# Patient Record
Sex: MALE | Race: WHITE | URBAN - METROPOLITAN AREA
[De-identification: names, ages, dates, MRNs, and addresses within clinical notes are randomized per-mention and may not be internally consistent; named-entity substitution may affect disease eponyms.]

---

## 2018-07-07 ENCOUNTER — OFFICE VISIT (OUTPATIENT)
Dept: URGENT CARE | Facility: URGENT CARE | Age: 18
End: 2018-07-07
Payer: COMMERCIAL

## 2018-07-07 VITALS
HEART RATE: 107 BPM | BODY MASS INDEX: 23.03 KG/M2 | TEMPERATURE: 97.5 F | DIASTOLIC BLOOD PRESSURE: 72 MMHG | WEIGHT: 170 LBS | OXYGEN SATURATION: 99 % | HEIGHT: 72 IN | SYSTOLIC BLOOD PRESSURE: 111 MMHG

## 2018-07-07 DIAGNOSIS — R21 RASH: Primary | ICD-10-CM

## 2018-07-07 PROCEDURE — 99202 OFFICE O/P NEW SF 15 MIN: CPT | Performed by: PHYSICIAN ASSISTANT

## 2018-07-07 RX ORDER — METHYLPREDNISOLONE 4 MG
TABLET, DOSE PACK ORAL
Qty: 21 TABLET | Refills: 0 | Status: SHIPPED | OUTPATIENT
Start: 2018-07-07

## 2018-07-07 NOTE — MR AVS SNAPSHOT
After Visit Summary   7/7/2018    Puma Santiago    MRN: [id number]           Patient Information     Date Of Birth          2000        Visit Information        Provider Department      7/7/2018 7:05 PM Awa Henrdix PA-C [facility]        Today's Diagnoses     Rash    -  1       Follow-ups after your visit        Who to contact     If you have questions or need follow up information about today's clinic visit or your schedule please contact [facility] directly at [phone].  Normal or non-critical lab and imaging results will be communicated to you by MyChart, letter or phone within 4 business days after the clinic has received the results. If you do not hear from us within 7 days, please contact the clinic through MyChart or phone. If you have a critical or abnormal lab result, we will notify you by phone as soon as possible.  Submit refill requests through [email or web address] or call your pharmacy and they will forward the refill request to us. Please allow 3 business days for your refill to be completed.          Additional Information About Your Visit        Care EveryWhere ID     This is your Care EveryWhere ID. This could be used by other organizations to access your [facility] medical records  [id number]        Your Vitals Were     Pulse Temperature Height Pulse Oximetry BMI (Body Mass Index)       107 97.5  F (36.4  C) (Tympanic) 6' (1.829 m) 99% 23.06 kg/m2        Blood Pressure from Last 3 Encounters:   07/07/18 111/72   01/22/12 139/81    Weight from Last 3 Encounters:   07/07/18 170 lb (77.1 kg) (70 %)*   01/22/12 125 lb (56.7 kg) (78 %)*     * Growth percentiles are based on CDC 2-20 Years data.              Today, you had the following     No orders found for display         Today's Medication Changes          These changes are accurate as of 7/7/18  7:44 PM.  If you have any questions, ask your nurse or doctor.               Start  taking these medicines.        Dose/Directions    methylPREDNISolone 4 MG tablet   Commonly known as:  MEDROL DOSEPAK   Used for:  Rash   Started by:  Awa Hendrix PA-C        Follow package instructions   Quantity:  21 tablet   Refills:  0            Where to get your medicines      These medications were sent to [id number] - [place], [place] - [address] AT [facility] [address]  [address], [place] [address]    Hours:  24-hours Phone:  [phone]     methylPREDNISolone 4 MG tablet                Primary Care Provider Office Phone # Fax #    Berlin Goldstein [phone]       [facility] [address] [address] [address]        Equal Access to Services     BRIE GALLO : Hadii martín chamorro hadasho Soomaali, waaxda luqadaha, qaybta kaalmada adeegyada, waxay scottin haykelsey pierre . So [facility] [phone].    ATENCIÓN: Si habla español, tiene a greenwood disposición servicios gratuitos de asistencia lingüística. [facility] [phone].    We comply with applicable federal civil rights laws and [place] laws. We do not discriminate on the basis of race, color, national origin, age, disability, sex, sexual orientation, or gender identity.            Thank you!     Thank you for choosing [facility]  for your care. Our goal is always to provide you with excellent care. Hearing back from our patients is one way we can continue to improve our services. Please take a few minutes to complete the written survey that you may receive in the mail after your visit with us. Thank you!             Your Updated Medication List - Protect others around you: Learn how to safely use, store and throw away your medicines at www.disposemymeds.org.          This list is accurate as of 7/7/18  7:44 PM.  Always use your most recent med list.                   Brand Name Dispense Instructions for use Diagnosis    acetaminophen-codeine  300-30 MG per tablet    TYLENOL WITH CODEINE #3    20 tablet    Take 1-2 tablets by mouth every 6 hours as needed for pain.        CELEXA PO           methylPREDNISolone 4 MG tablet    MEDROL DOSEPAK    21 tablet    Follow package instructions    Rash

## 2018-07-08 NOTE — PROGRESS NOTES
HPI:  Puma is a 16 yo male who presents for red rash on scalp after applying hair dye 2 days ago.    Reports he has dyed his hair before but this time he used an excessive amount of dye.  He left it on for 30 minutes.  Reports very itchy.  Reports some swelling in his face as well.   He has put essential oils and aloe on rash but it has not improved it.  Feels maybe rash has spread a bit to top of his shoulders.    No SOB or difficulty breathing.    ROS:  See HPI      PE:  Vitals & nursing notes reviewed. B/P: 111/72, T: 97.5, P: 107, R: Data Unavailable  Constitutional:  Alert, well nourished, well-developed, NAD  Head/scalp:  Confluent erythematous maculopapular rash on scalp, forehead, ears, and extending onto neck and shoulders.  No d/c.   Edema appreciated to right portion of scalp/forehead and ear pinnas.        ASSESSMENT:  (R21) Rash     Comment:  Contact dermatitis /reaction to hair dye.   Does not look infectious.  Consulted with Dr. DAILY Pacheco who agrees does not feel rash looked infectious.  Plan: methylPREDNISolone (MEDROL DOSEPAK) 4 MG tablet        Benadryl - will cause drowsiness.  May also try OTC zantac daily.  Avoid hot showers.  Avoid scratching.  Monitor for signs of infection including fever, pus d/c, spreading erythema and edema.   F/U with PCP if sx persist or worsen.